# Patient Record
Sex: FEMALE | Race: BLACK OR AFRICAN AMERICAN | NOT HISPANIC OR LATINO | URBAN - METROPOLITAN AREA
[De-identification: names, ages, dates, MRNs, and addresses within clinical notes are randomized per-mention and may not be internally consistent; named-entity substitution may affect disease eponyms.]

---

## 2022-11-11 ENCOUNTER — OFFICE (OUTPATIENT)
Dept: URBAN - METROPOLITAN AREA CLINIC 34 | Facility: CLINIC | Age: 77
End: 2022-11-11

## 2022-11-11 VITALS
DIASTOLIC BLOOD PRESSURE: 61 MMHG | TEMPERATURE: 97.3 F | HEIGHT: 64 IN | SYSTOLIC BLOOD PRESSURE: 133 MMHG | WEIGHT: 173.4 LBS | HEART RATE: 82 BPM

## 2022-11-11 DIAGNOSIS — R89.4 ABNORMAL IMMUNOLOGICAL FINDINGS IN SPECIMENS FROM OTHER ORGA: ICD-10-CM

## 2022-11-11 DIAGNOSIS — D50.9 IRON DEFICIENCY ANEMIA, UNSPECIFIED: ICD-10-CM

## 2022-11-11 PROCEDURE — 99204 OFFICE O/P NEW MOD 45 MIN: CPT | Performed by: PHYSICIAN ASSISTANT

## 2023-01-11 ENCOUNTER — ON CAMPUS - OUTPATIENT (OUTPATIENT)
Dept: URBAN - METROPOLITAN AREA HOSPITAL 65 | Facility: HOSPITAL | Age: 78
End: 2023-01-11
Payer: COMMERCIAL

## 2023-01-11 DIAGNOSIS — K22.2 ESOPHAGEAL OBSTRUCTION: ICD-10-CM

## 2023-01-11 DIAGNOSIS — Z53.8 PROCEDURE AND TREATMENT NOT CARRIED OUT FOR OTHER REASONS: ICD-10-CM

## 2023-01-11 DIAGNOSIS — D50.9 IRON DEFICIENCY ANEMIA, UNSPECIFIED: ICD-10-CM

## 2023-01-11 PROCEDURE — 43248 EGD GUIDE WIRE INSERTION: CPT | Performed by: INTERNAL MEDICINE

## 2023-01-11 PROCEDURE — 45378 DIAGNOSTIC COLONOSCOPY: CPT | Performed by: INTERNAL MEDICINE

## 2023-01-12 PROBLEM — K22.2 ESOPHAGEAL STRICTURE: Status: ACTIVE | Noted: 2023-01-12

## 2023-01-12 PROBLEM — K21.00 ESOPHAGITIS: Status: ACTIVE | Noted: 2023-01-12

## 2023-01-30 NOTE — SERVICEHPINOTES
span style="background-color: rgb(255, 255, 0)" visited="true"Pt is a 76 yr old female from [place] here to discuss anemia and + celiac panel. She was first found to be anemic 05/22. She received iron infusions and her CBC and iron studies normalized. She has a deamidated gliadin IgG high at 62 but the rest of the celiac panel is negative. /span  This is the first time she has ever been anemic to her knowledge. No prior EGD/Colonoscopy.   No blood in the stool or black stool. Has regular, formed BMs. No abdominal pain. Had vomiting for a day or so earlier this month (wasn't sick otherwise) but that passed. Not nauseous and eating well w/o early satiety and anorexia. No unexplained weight loss. No family hx of GI issues. No known heart issues. Takes a daily baby ASA. She has dementia but was able to tell her history for me well. She states her daughter is her POA who will be present at the time of her procedures to consent the patient. No other GI related complaints today.  Abdomen soft, non-tender and non-distended, no rebound, no guarding and no masses. no hepatosplenomegaly.